# Patient Record
Sex: FEMALE | Race: BLACK OR AFRICAN AMERICAN | URBAN - METROPOLITAN AREA
[De-identification: names, ages, dates, MRNs, and addresses within clinical notes are randomized per-mention and may not be internally consistent; named-entity substitution may affect disease eponyms.]

---

## 2023-08-08 ENCOUNTER — TELEPHONE (OUTPATIENT)
Dept: OTOLARYNGOLOGY | Facility: CLINIC | Age: 68
End: 2023-08-08

## 2023-08-08 NOTE — TELEPHONE ENCOUNTER
Devora Evans calling from [facility] walk [facility] in [facility] stzaria pt has appt on 8/21 asking does pt need to have any imaging done before appt or any labs done please advise

## 2023-08-09 ENCOUNTER — TELEPHONE (OUTPATIENT)
Dept: OTOLARYNGOLOGY | Facility: CLINIC | Age: 68
End: 2023-08-09

## 2023-08-09 NOTE — TELEPHONE ENCOUNTER
LMTCB Pt calling for a nurse:  Pt stated that  PCP increased her Rx Lisinopril and that she is not feeling the same taking the increased dose.  She stated  that  she is feeling strange and wants to know if she should continue the increased dose of Rx Lisinopril  ?     CELL: [phone]

## 2023-08-09 NOTE — TELEPHONE ENCOUNTER
Spoke to Laurie, provider from the [facility] center in [place], informed her testing/imaging is not ordered until patient is seen by the doctor. Attempted to offer her an earlier appointment, Laurie requested I speak to the patient directly. Called patient and LMTCB.

## 2023-09-18 ENCOUNTER — OFFICE VISIT (OUTPATIENT)
Dept: OTOLARYNGOLOGY | Facility: CLINIC | Age: 68
End: 2023-09-18

## 2023-09-18 DIAGNOSIS — K11.20 SIALOADENITIS OF SUBMANDIBULAR GLAND: Primary | ICD-10-CM

## 2023-09-18 PROCEDURE — 99203 OFFICE O/P NEW LOW 30 MIN: CPT | Performed by: OTOLARYNGOLOGY

## 2023-09-18 RX ORDER — TIZANIDINE 2 MG/1
TABLET ORAL
COMMUNITY

## 2023-09-18 RX ORDER — IBUPROFEN 600 MG/1
TABLET ORAL
COMMUNITY
Start: 2023-07-09

## 2023-09-18 RX ORDER — EPINEPHRINE 0.3 MG/.3ML
INJECTION SUBCUTANEOUS
COMMUNITY

## 2023-09-18 RX ORDER — KETOCONAZOLE 20 MG/G
CREAM TOPICAL
COMMUNITY

## 2023-09-18 RX ORDER — CETIRIZINE HYDROCHLORIDE 10 MG/1
TABLET ORAL
COMMUNITY
Start: 2020-12-18

## 2023-09-18 RX ORDER — AMOXICILLIN AND CLAVULANATE POTASSIUM 875; 125 MG/1; MG/1
1 TABLET, FILM COATED ORAL EVERY 12 HOURS
Qty: 20 TABLET | Refills: 0 | Status: SHIPPED | OUTPATIENT
Start: 2023-09-18

## 2023-09-18 RX ORDER — KETOCONAZOLE 20 MG/ML
SHAMPOO TOPICAL
COMMUNITY

## 2023-09-18 RX ORDER — METHYLPREDNISOLONE 4 MG/1
TABLET ORAL
Qty: 21 TABLET | Refills: 0 | Status: SHIPPED | OUTPATIENT
Start: 2023-09-18

## 2023-09-18 RX ORDER — ALBUTEROL SULFATE 90 UG/1
AEROSOL, METERED RESPIRATORY (INHALATION)
COMMUNITY

## 2023-10-23 ENCOUNTER — OFFICE VISIT (OUTPATIENT)
Dept: OTOLARYNGOLOGY | Facility: CLINIC | Age: 68
End: 2023-10-23

## 2023-10-23 DIAGNOSIS — K11.20 SIALOADENITIS OF SUBMANDIBULAR GLAND: Primary | ICD-10-CM

## 2023-10-23 PROCEDURE — 99213 OFFICE O/P EST LOW 20 MIN: CPT | Performed by: OTOLARYNGOLOGY

## 2023-10-23 RX ORDER — AMOXICILLIN AND CLAVULANATE POTASSIUM 875; 125 MG/1; MG/1
1 TABLET, FILM COATED ORAL EVERY 12 HOURS
Qty: 20 TABLET | Refills: 0 | Status: SHIPPED | OUTPATIENT
Start: 2023-10-23

## 2023-10-23 NOTE — PROGRESS NOTES
Vega Baker is a 77year old female. Patient presents with: Follow - Up: Sialoadenitis of submandibular gland, pt reports improvements. HISTORY OF PRESENT ILLNESS  She reports a 1 to 2-month history of having some swelling of the floor of her mouth. States that her left submandibular region will also swell up when she would eat. Recently she had significant swelling and noticed that the area under her tongue popped and drained and things have been better but she still feels pain and fullness in the submandibular region on the left as well as on the floor of mouth on the left. She drinks about 1 to 2 glasses of water a day. Currently not on any medications at dehydrates. No other signs, symptoms or complaints. She does note that eating seems to make things slightly worse at times. Swelling of the floor of mouth on the left. Erythema of the     10/23/23 last visit noted to have a left sided sialoadenitis with stone in the floor of mouth. Started on Medrol Dosepak as well as Augmentin with complete resolution of her symptoms on the left unfortunately she slowly developed symptoms on the right in the last few days. This started hurting like it did on the left side in the beginning but never got as bad in fact seems to be heading in the good direction at this time. She has increased her hydration uses massage as well as heat to the affected areas. Using sialagogues as well. On no significant medications to cause dryness although she does note that this all started after being on Breo. Went back to [facility] and doing better with her breathing      Social History    Socioeconomic History      Marital status:       History reviewed. No pertinent family history. History reviewed. No pertinent past medical history. History reviewed. No pertinent surgical history. REVIEW OF SYSTEMS    System Neg/Pos Details   Constitutional Negative Fatigue, fever and weight loss.    ENMT Negative Drooling. Eyes Negative Blurred vision and vision changes. Respiratory Negative Dyspnea and wheezing. Cardio Negative Chest pain, irregular heartbeat/palpitations and syncope. GI Negative Abdominal pain and diarrhea. Endocrine Negative Cold intolerance and heat intolerance. Neuro Negative Tremors. Psych Negative Anxiety and depression. Integumentary Negative Frequent skin infections, pigment change and rash. Hema/Lymph Negative Easy bleeding and easy bruising. PHYSICAL EXAM    There were no vitals taken for this visit. Constitutional Normal Overall appearance - Normal.   Psychiatric Normal Orientation - Oriented to time, place, person & situation. Appropriate mood and affect. Neck Exam Normal Inspection - Normal. Palpation - Normal. Parotid gland - Normal. Thyroid gland - Normal.  Right submandibular gland with calculi? Eyes Normal Conjunctiva - Right: Normal, Left: Normal. Pupil - Right: Normal, Left: Normal. Fundus - Right: Normal, Left: Normal.   Neurological Normal Memory - Normal. Cranial nerves - Cranial nerves II through XII grossly intact. Head/Face Normal Facial features - Normal. Eyebrows - Normal. Skull - Normal.        Nasopharynx Normal External nose - Normal. Lips/teeth/gums - Normal. Tonsils - Normal. Oropharynx - Normal.   Ears Normal Inspection - Right: Normal, Left: Normal. Canal - Right: Normal, Left: Normal. TM - Right: Normal, Left: Normal.   Skin Normal Inspection - Normal.        Lymph Detail Normal Submental. Submandibular. Anterior cervical. Posterior cervical. Supraclavicular.         Nose/Mouth/Throat Normal External nose - Normal. Lips/teeth/gums - Normal. Tonsils - Normal. Oropharynx - Normal.  Oral cavity floor of mouth with what appears to be a small stone at [facility] duct entrance   Nose/Mouth/Throat Normal Nares - Right: Normal Left: Normal. Septum -Normal  Turbinates - Right: Normal, Left: Normal.       Current Outpatient Medications: amoxicillin clavulanate 875-125 MG Oral Tab, Take 1 tablet by mouth every 12 (twelve) hours. , Disp: 20 tablet, Rfl: 0    tiZANidine 2 MG Oral Tab, Take 1 tablet every 6 hours by oral route as needed for 30 days. , Disp: , Rfl:     ketoconazole 2 % External Cream, APPLY TO THE AFFECTED AREA(S) BY TOPICAL ROUTE ONCE DAILY, Disp: , Rfl:     ketoconazole 2 % External Shampoo, APPLY TO THE AFFECTED AREA(S), LATHER, LEAVE IN PLACE FOR 5 MINUTES, AND THEN RINSE OFF WITH WATER BY TOPICAL ROUTE ONCE DAILY, Disp: , Rfl:     ibuprofen 600 MG Oral Tab, Take 1 tablet every 8 hours by oral route as needed. , Disp: , Rfl:     EPINEPHrine 0.3 MG/0.3ML Injection Solution Auto-injector, Take 1 auto by injection route as needed. , Disp: , Rfl:     Dextromethorphan-guaiFENesin 5-100 MG/5ML Oral Liquid, Take 10 mL every 8 hours by oral route as needed. , Disp: , Rfl:     cetirizine 10 MG Oral Tab, Take 1 tablet every day by oral route as needed. , Disp: , Rfl:     albuterol 108 (90 Base) MCG/ACT Inhalation Aero Soln, Inhale 2 puffs every 4-6 hours by inhalation route., Disp: , Rfl:     methylPREDNISolone 4 MG Oral Tablet Therapy Pack, Take by oral route., Disp: 21 tablet, Rfl: 0  ASSESSMENT AND PLAN    1. Sialoadenitis of submandibular gland  Complete resolution of her sialoadenitis of the left submental gland only to have it now recurred on the right side. Once again has what appears to be a tiny stone within the most medial aspect of the [place]'s duct at the opening in the floor of mouth. Start Augmentin once again as she did so well on antibiotics and steroids. We will hold off on steroids for now as this is not as bad as her last infection on the left.   Return to see me in 1 to 2 months and if she continues to have issues with recurrent swelling and stones in the floor mouth I would highly recommend that she undergo a CT scan of her neck with contrast to evaluate for chronic sialoadenitis and stone formation        This note was prepared using [facility] [employer] voice recognition dictation software. As a result errors may occur. When identified these errors have been corrected. While every attempt is made to correct errors during dictation discrepancies may still exist    Daniel Moy MD    10/23/2023    3:35 PM

## 2023-12-18 ENCOUNTER — OFFICE VISIT (OUTPATIENT)
Dept: OTOLARYNGOLOGY | Facility: CLINIC | Age: 68
End: 2023-12-18

## 2023-12-18 DIAGNOSIS — K11.20 SIALOADENITIS OF SUBMANDIBULAR GLAND: Primary | ICD-10-CM

## 2023-12-18 PROCEDURE — 99213 OFFICE O/P EST LOW 20 MIN: CPT | Performed by: OTOLARYNGOLOGY

## 2023-12-18 RX ORDER — BUDESONIDE AND FORMOTEROL FUMARATE 80; 4.5 UG/1; UG/1
AEROSOL, METERED RESPIRATORY (INHALATION)
COMMUNITY
Start: 2023-12-14

## 2023-12-19 NOTE — PROGRESS NOTES
Radha Gore is a 77year old female. Chief Complaint   Patient presents with    Follow - Up     Pt reports improvements , no longer having swelling or stones. HISTORY OF PRESENT ILLNESS  She reports a 1 to 2-month history of having some swelling of the floor of her mouth. States that her left submandibular region will also swell up when she would eat. Recently she had significant swelling and noticed that the area under her tongue popped and drained and things have been better but she still feels pain and fullness in the submandibular region on the left as well as on the floor of mouth on the left. She drinks about 1 to 2 glasses of water a day. Currently not on any medications at dehydrates. No other signs, symptoms or complaints. She does note that eating seems to make things slightly worse at times. Swelling of the floor of mouth on the left. Erythema of the     10/23/23 last visit noted to have a left sided sialoadenitis with stone in the floor of mouth. Started on Medrol Dosepak as well as Augmentin with complete resolution of her symptoms on the left unfortunately she slowly developed symptoms on the right in the last few days. This started hurting like it did on the left side in the beginning but never got as bad in fact seems to be heading in the good direction at this time. She has increased her hydration uses massage as well as heat to the affected areas. Using sialagogues as well. On no significant medications to cause dryness although she does note that this all started after being on Breo. Went back to [employer] and doing better with her breathing     12/18/23 Doing very well. Last visit started on Augmentin with complete resolution of her submandibular swelling. No pain in the neck or in the floor of the mouth. Social History     Socioeconomic History    Marital status:        History reviewed. No pertinent family history. History reviewed.  No pertinent past medical history. History reviewed. No pertinent surgical history. REVIEW OF SYSTEMS    System Neg/Pos Details   Constitutional Negative Fatigue, fever and weight loss. ENMT Negative Drooling. Eyes Negative Blurred vision and vision changes. Respiratory Negative Dyspnea and wheezing. Cardio Negative Chest pain, irregular heartbeat/palpitations and syncope. GI Negative Abdominal pain and diarrhea. Endocrine Negative Cold intolerance and heat intolerance. Neuro Negative Tremors. Psych Negative Anxiety and depression. Integumentary Negative Frequent skin infections, pigment change and rash. Hema/Lymph Negative Easy bleeding and easy bruising. PHYSICAL EXAM    There were no vitals taken for this visit. Constitutional Normal Overall appearance - Normal.   Psychiatric Normal Orientation - Oriented to time, place, person & situation. Appropriate mood and affect. Neck Exam Normal Inspection - Normal. Palpation - Normal. Parotid gland - Normal. Thyroid gland - Normal.   Eyes Normal Conjunctiva - Right: Normal, Left: Normal. Pupil - Right: Normal, Left: Normal. Fundus - Right: Normal, Left: Normal.   Neurological Normal Memory - Normal. Cranial nerves - Cranial nerves II through XII grossly intact. Head/Face Normal Facial features - Normal. Eyebrows - Normal. Skull - Normal.        Nasopharynx Normal External nose - Normal. Lips/teeth/gums - Normal. Tonsils - Normal. Oropharynx - Normal.   Ears Normal Inspection - Right: Normal, Left: Normal. Canal - Right: Normal, Left: Normal. TM - Right: Normal, Left: Normal.   Skin Normal Inspection - Normal.        Lymph Detail Normal Submental. Submandibular. Anterior cervical. Posterior cervical. Supraclavicular.         Nose/Mouth/Throat Normal External nose - Normal. Lips/teeth/gums - Normal. Tonsils - Normal. Oropharynx - Normal.   Nose/Mouth/Throat Normal Nares - Right: Normal Left: Normal. Septum -Normal  Turbinates - Right: Normal, Left: Normal.       Current Outpatient Medications:     BREYNA 80-4.5 MCG/ACT Inhalation Aerosol, , Disp: , Rfl:     amoxicillin clavulanate 875-125 MG Oral Tab, Take 1 tablet by mouth every 12 (twelve) hours. , Disp: 20 tablet, Rfl: 0    tiZANidine 2 MG Oral Tab, Take 1 tablet every 6 hours by oral route as needed for 30 days. , Disp: , Rfl:     ketoconazole 2 % External Cream, APPLY TO THE AFFECTED AREA(S) BY TOPICAL ROUTE ONCE DAILY, Disp: , Rfl:     ketoconazole 2 % External Shampoo, APPLY TO THE AFFECTED AREA(S), LATHER, LEAVE IN PLACE FOR 5 MINUTES, AND THEN RINSE OFF WITH WATER BY TOPICAL ROUTE ONCE DAILY, Disp: , Rfl:     ibuprofen 600 MG Oral Tab, Take 1 tablet every 8 hours by oral route as needed. , Disp: , Rfl:     EPINEPHrine 0.3 MG/0.3ML Injection Solution Auto-injector, Take 1 auto by injection route as needed. , Disp: , Rfl:     Dextromethorphan-guaiFENesin 5-100 MG/5ML Oral Liquid, Take 10 mL every 8 hours by oral route as needed. , Disp: , Rfl:     cetirizine 10 MG Oral Tab, Take 1 tablet every day by oral route as needed. , Disp: , Rfl:     albuterol 108 (90 Base) MCG/ACT Inhalation Aero Soln, Inhale 2 puffs every 4-6 hours by inhalation route., Disp: , Rfl:     methylPREDNISolone 4 MG Oral Tablet Therapy Pack, Take by oral route., Disp: 21 tablet, Rfl: 0  ASSESSMENT AND PLAN    1. Sialoadenitis of submandibular gland  Completely resolved symptoms at this point. Continue hydration and use of sialogogues. Heat and massage as needed. RTC as needed. This note was prepared using [employer] voice recognition dictation software. As a result errors may occur. When identified these errors have been corrected. While every attempt is made to correct errors during dictation discrepancies may still exist    Daniel Auguste MD    12/18/2023    10:31 PM